# Patient Record
Sex: FEMALE | Employment: FULL TIME | URBAN - METROPOLITAN AREA
[De-identification: names, ages, dates, MRNs, and addresses within clinical notes are randomized per-mention and may not be internally consistent; named-entity substitution may affect disease eponyms.]

---

## 2017-04-01 ENCOUNTER — TELEPHONE (OUTPATIENT)
Dept: FAMILY MEDICINE | Facility: CLINIC | Age: 22
End: 2017-04-01

## 2017-04-03 ENCOUNTER — ALLIED HEALTH/NURSE VISIT (OUTPATIENT)
Dept: NURSING | Facility: CLINIC | Age: 22
End: 2017-04-03
Payer: COMMERCIAL

## 2017-04-03 DIAGNOSIS — Z11.1 SCREENING EXAMINATION FOR PULMONARY TUBERCULOSIS: Primary | ICD-10-CM

## 2017-04-03 PROCEDURE — 86580 TB INTRADERMAL TEST: CPT

## 2017-04-03 PROCEDURE — 99207 ZZC NO CHARGE NURSE ONLY: CPT

## 2017-04-03 NOTE — PROGRESS NOTES
Chief Complaint   Patient presents with     Imm/Inj     Mantoux         The patient is asked the following questions today and these are her answers:    -Have you had a mantoux administered in the past 30 days?    No  -Have you had a previous positive Mantoux.  No  -Have you received BCG in the past.  No  -Have you had a live vaccine  (MMR, Varicella, OPV, Yellow Fever) in the last 6 weeks.  No  -Have you had and active  viral or bacterial infection in the past 6 weeks.  No  -Have you received corticosteroids or immunosuppressive agents in the past 6 weeks.  No  -Have you been diagnosed with HIV?  No  -Do you have a maglinancy?  No   Nery Kramer CMA ([facility])

## 2017-04-03 NOTE — MR AVS SNAPSHOT
"              After Visit Summary   4/3/2017    Shari De La Cruz    MRN: [id number]           Patient Information     Date Of Birth          1995        Visit Information        Provider Department      4/3/2017 1:30 PM FZ ANCILLARY [facility]        Today's Diagnoses     Screening examination for pulmonary tuberculosis    -  1       Follow-ups after your visit        Your next 10 appointments already scheduled     Apr 21, 2017  1:40 PM CDT   PHYSICAL with Argelia Vicente, APRN CNP   [facility] ([facility]    [address] [id number] [phone]              Who to contact     If you have questions or need follow up information about today's clinic visit or your schedule please contact [facility] directly at [phone].  Normal or non-critical lab and imaging results will be communicated to you by Makstrhart, letter or phone within 4 business days after the clinic has received the results. If you do not hear from us within 7 days, please contact the clinic through MyChart or phone. If you have a critical or abnormal lab result, we will notify you by phone as soon as possible.  Submit refill requests through [employer] or call your pharmacy and they will forward the refill request to us. Please allow 3 business days for your refill to be completed.          Additional Information About Your Visit        MyChart Information     [employer] lets you send messages to your doctor, view your test results, renew your prescriptions, schedule appointments and more. To sign up, go to [email or web address] . Click on \"Log in\" on the left side of the screen, which will take you to the Welcome page. Then click on \"Sign up Now\" on the right side of the page.     You will be asked to enter the access code listed below, as well as some personal information. Please follow the directions to create your username and password.     Your access code is: " [email or web address]  Expires: 2017  1:54 PM     Your access code will  in 90 days. If you need help or a new code, please call your [place] clinic or [phone].        Care EveryWhere ID     This is your Care EveryWhere ID. This could be used by other organizations to access your [place] medical records  [id number]         Blood Pressure from Last 3 Encounters:   10/02/15 104/62   11/03/10 113/71   08/30/10 106/71    Weight from Last 3 Encounters:   10/02/15 114 lb (51.7 kg)   11/03/10 102 lb (46.3 kg) (14 %)*   08/30/10 107 lb (48.5 kg) (25 %)*     * Growth percentiles are based on [facility] 2-20 Years data.              We Performed the Following     TB INTRADERMAL TEST        Primary Care Provider Office Phone # Fax #    Yao Mohamud [phone]       [facility] [address] [address]        Thank you!     Thank you for choosing [facility]  for your care. Our goal is always to provide you with excellent care. Hearing back from our patients is one way we can continue to improve our services. Please take a few minutes to complete the written survey that you may receive in the mail after your visit with us. Thank you!             Your Updated Medication List - Protect others around you: Learn how to safely use, store and throw away your medicines at www.disposemymeds.org.          This list is accurate as of: 4/3/17  1:54 PM.  Always use your most recent med list.                   Brand Name Dispense Instructions for use    doxycycline Monohydrate 100 MG Tabs          ketoconazole 2 % cream    NIZORAL     Apply topically 2 times daily apply to affected areas

## 2017-04-20 NOTE — PATIENT INSTRUCTIONS
[facility]    If you have any questions regarding to your visit please contact your care team:       Team Red:   Clinic Hours Telephone Number   Dr. Rosa Adler  (pediatrics)  Argelia Vicente NP 7am-7pm  Monday - Thursday   7am-5pm  Fridays  [phone] (fax)    Mega MOYA  [phone]   Urgent Care - [place] and [place] Monday-Friday  [place] - 11am-8pm  Saturday-Sunday  Both sites - 9am-5pm  [phone] - [facility]  [phone] - [place]       What options do I have for visits at the clinic other than the traditional office visit?  To expand how we care for you, many of our providers are utilizing electronic visits (e-visits) and telephone visits, when medically appropriate, for interactions with their patients rather than a visit in the clinic.   We also offer nurse visits for many medical concerns. Just like any other service, we will bill your insurance company for this type of visit based on time spent on the phone with your provider. Not all insurance companies cover these visits. Please check with your medical insurance if this type of visit is covered. You will be responsible for any charges that are not paid by your insurance.      E-visits via [employer] [address]:  generally incur a $35.00 fee.  Telephone visits:  Time spent on the phone: *charged based on time that is spent on the phone in increments of 10 minutes. Estimated cost:   5-10 mins $30.00   11-20 mins. $59.00   21-30 mins. $85.00     As always, Thank you for trusting us with your health care needs!              Preventive Health Recommendations  Female Ages 18 to 25     Yearly exam:     See your health care provider every year in order to  o Review health changes.   o Discuss preventive care.    o Review your medicines if your doctor has prescribed any.      You should be tested each year for STDs (sexually transmitted diseases).       After age 20, talk to your  provider about how often you should have cholesterol testing.      Starting at age 21, get a Pap test every three years. If you have an abnormal result, your doctor may have you test more often.      If you are at risk for diabetes, you should have a diabetes test (fasting glucose).     Shots:     Get a flu shot each year.     Get a tetanus shot every 10 years.     Consider getting the shot (vaccine) that prevents cervical cancer (Gardasil).    Nutrition:     Eat at least 5 servings of fruits and vegetables each day.    Eat whole-grain bread, whole-wheat pasta and brown rice instead of white grains and rice.    Talk to your provider about Calcium and Vitamin D.     Lifestyle    Exercise at least 150 minutes a week each week (30 minutes a day, 5 days a week). This will help you control your weight and prevent disease.    Limit alcohol to one drink per day.    No smoking.     Wear sunscreen to prevent skin cancer.    See your dentist every six months for an exam and cleaning.

## 2017-04-20 NOTE — PROGRESS NOTES
SUBJECTIVE:     CC: Shari De La Cruz is an 21 year old woman who presents for preventive health visit.     Healthy Habits:    Do you get at least three servings of calcium containing foods daily (dairy, green leafy vegetables, etc.)? yes    Amount of exercise or daily activities, outside of work: some time    Problems taking medications regularly No    Medication side effects: No    Have you had an eye exam in the past two years? yes    Do you see a dentist twice per year? yes    Do you have sleep apnea, excessive snoring or daytime drowsiness?no        Blood work done    Today's PHQ-2 Score:   PHQ-2 ( 1999 Pfizer) 10/2/2015   Q1: Little interest or pleasure in doing things 0   Q2: Feeling down, depressed or hopeless 0   PHQ-2 Score 0       Abuse: Current or Past(Physical, Sexual or Emotional)- No  Do you feel safe in your environment - Yes    Social History   Substance Use Topics     Smoking status: Never Smoker     Smokeless tobacco: Not on file     Alcohol use No     The patient does not drink >3 drinks per day nor >7 drinks per week.    No results for input(s): CHOL, HDL, LDL, TRIG, CHOLHDLRATIO, NHDL in the last 65107 hours.    Reviewed orders with patient.  Reviewed health maintenance and updated orders accordingly - Yes    Mammo Decision Support:  Mammogram not appropriate for this patient based on age.    Pertinent mammograms are reviewed under the imaging tab.  History of abnormal Pap smear: NO - age 21-29 PAP every 3 years recommended: patient declines pap today, will consider next year    Reviewed and updated as needed this visit by clinical staff         Reviewed and updated as needed this visit by Provider            ROS:  C: NEGATIVE for fever, chills, change in weight  I: NEGATIVE for worrisome rashes, moles or lesions  E: NEGATIVE for vision changes or irritation  ENT: NEGATIVE for ear, mouth and throat problems  R: NEGATIVE for significant cough or SOB  B: NEGATIVE for masses, tenderness or  "discharge  CV: NEGATIVE for chest pain, palpitations or peripheral edema  GI: NEGATIVE for nausea, abdominal pain, heartburn, or change in bowel habits  : NEGATIVE for unusual urinary or vaginal symptoms. Periods are regular.  MUSCULOSKELETAL:Right midfoot pain when running or exercising, no pain at rest. Flat arches. Was recommended to try over the counter arch supports but has not been successful. Wonders about custom orthotics.  N: NEGATIVE for weakness, dizziness or paresthesias  P: NEGATIVE for changes in mood or affect    Problem list, Medication list, Allergies, and Medical/Social/Surgical histories reviewed in [facility] and updated as appropriate.  OBJECTIVE:     /67  Pulse 91  Temp 98.7  F (37.1  C)  Resp 13  Ht 5' 2.5\" (1.588 m)  Wt 117 lb (53.1 kg)  LMP 04/07/2017  SpO2 100%  BMI 21.06 kg/m2  EXAM:  GENERAL: healthy, alert and no distress  EYES: Eyes grossly normal to inspection, PERRL and conjunctivae and sclerae normal  HENT: ear canals and TM's normal, nose and mouth without ulcers or lesions  NECK: no adenopathy, no asymmetry, masses, or scars and thyroid normal to palpation  RESP: lungs clear to auscultation - no rales, rhonchi or wheezes  BREAST: normal without masses, tenderness or nipple discharge and no palpable axillary masses or adenopathy  CV: regular rate and rhythm, normal S1 S2, no S3 or S4, no murmur, click or rub, no peripheral edema and peripheral pulses strong  ABDOMEN: soft, nontender, no hepatosplenomegaly, no masses and bowel sounds normal  MS: no gross musculoskeletal defects noted, no edema. Right foot nontender to palpation of navulicular (area of described pain) with FROM of foot and ankle  SKIN: no suspicious lesions or rashes  NEURO: Normal strength and tone, mentation intact and speech normal  PSYCH: mentation appears normal, affect normal/bright    ASSESSMENT/PLAN:     1. Routine general medical examination at a health care facility      2. Screening examination " "for venereal disease  Patient declines pap today- will do next year  - NEISSERIA GONORRHOEA PCR  - CHLAMYDIA TRACHOMATIS PCR    3. CARDIOVASCULAR SCREENING; LDL GOAL LESS THAN 160    - Lipid panel reflex to direct LDL  - minocycline (MINOCIN/DYNACIN) 100 MG capsule; Take 1 capsule (100 mg) by mouth 2 times daily  Dispense: 180 capsule; Refill: 3    4. Right foot pain    - PODIATRY/FOOT & ANKLE SURGERY REFERRAL    COUNSELING:   Reviewed preventive health counseling, as reflected in patient instructions       Regular exercise       Healthy diet/nutrition       Contraception       Osteoporosis Prevention/Bone Health         reports that she has never smoked. She does not have any smokeless tobacco history on file.    Estimated body mass index is 21.02 kg/(m^2) as calculated from the following:    Height as of 10/2/15: 5' 1.75\" (1.568 m).    Weight as of 10/2/15: 114 lb (51.7 kg).       Counseling Resources:  ATP IV Guidelines  Pooled Cohorts Equation Calculator  Breast Cancer Risk Calculator  FRAX Risk Assessment  ICSI Preventive Guidelines  Dietary Guidelines for Americans, 2010  USDA's MyPlate  ASA Prophylaxis  Lung CA Screening    PHILLY Nichols [facility]ANASTASIA  "

## 2017-04-28 ENCOUNTER — OFFICE VISIT (OUTPATIENT)
Dept: FAMILY MEDICINE | Facility: CLINIC | Age: 22
End: 2017-04-28
Payer: COMMERCIAL

## 2017-04-28 VITALS
SYSTOLIC BLOOD PRESSURE: 105 MMHG | TEMPERATURE: 98.7 F | DIASTOLIC BLOOD PRESSURE: 67 MMHG | HEART RATE: 91 BPM | OXYGEN SATURATION: 100 % | BODY MASS INDEX: 20.73 KG/M2 | WEIGHT: 117 LBS | HEIGHT: 63 IN | RESPIRATION RATE: 13 BRPM

## 2017-04-28 DIAGNOSIS — Z00.00 ROUTINE GENERAL MEDICAL EXAMINATION AT A HEALTH CARE FACILITY: Primary | ICD-10-CM

## 2017-04-28 DIAGNOSIS — Z13.6 CARDIOVASCULAR SCREENING; LDL GOAL LESS THAN 160: ICD-10-CM

## 2017-04-28 DIAGNOSIS — Z11.3 SCREENING EXAMINATION FOR VENEREAL DISEASE: ICD-10-CM

## 2017-04-28 DIAGNOSIS — M79.671 RIGHT FOOT PAIN: ICD-10-CM

## 2017-04-28 LAB
CHOLEST SERPL-MCNC: 151 MG/DL
HDLC SERPL-MCNC: 76 MG/DL
LDLC SERPL CALC-MCNC: 58 MG/DL
NONHDLC SERPL-MCNC: 75 MG/DL
TRIGL SERPL-MCNC: 85 MG/DL

## 2017-04-28 PROCEDURE — 99395 PREV VISIT EST AGE 18-39: CPT | Performed by: NURSE PRACTITIONER

## 2017-04-28 PROCEDURE — 36415 COLL VENOUS BLD VENIPUNCTURE: CPT | Performed by: NURSE PRACTITIONER

## 2017-04-28 PROCEDURE — 80061 LIPID PANEL: CPT | Performed by: NURSE PRACTITIONER

## 2017-04-28 PROCEDURE — 87491 CHLMYD TRACH DNA AMP PROBE: CPT | Performed by: NURSE PRACTITIONER

## 2017-04-28 PROCEDURE — 87591 N.GONORRHOEAE DNA AMP PROB: CPT | Performed by: NURSE PRACTITIONER

## 2017-04-28 RX ORDER — MINOCYCLINE HYDROCHLORIDE 100 MG/1
100 CAPSULE ORAL 2 TIMES DAILY
Qty: 180 CAPSULE | Refills: 3 | COMMUNITY
Start: 2017-04-28 | End: 2018-03-15

## 2017-04-28 NOTE — NURSING NOTE
"Chief Complaint   Patient presents with     Physical       Initial /67  Pulse 91  Temp 98.7  F (37.1  C)  Resp 13  Ht 5' 2.5\" (1.588 m)  Wt 117 lb (53.1 kg)  LMP 04/07/2017  SpO2 100%  BMI 21.06 kg/m2 Estimated body mass index is 21.06 kg/(m^2) as calculated from the following:    Height as of this encounter: 5' 2.5\" (1.588 m).    Weight as of this encounter: 117 lb (53.1 kg).  Medication Reconciliation: complete     Cate Rodriguez. MA      "

## 2017-04-28 NOTE — MR AVS SNAPSHOT
After Visit Summary   4/28/2017    Shari De La Cruz    MRN: [id number]           Patient Information     Date Of Birth          1995        Visit Information        Provider Department      4/28/2017 7:40 AM Argelia Vicente APRN [facility]        Today's Diagnoses     Routine general medical examination at a health care facility    -  1    Screening examination for venereal disease        Screening for malignant neoplasm of cervix        CARDIOVASCULAR SCREENING; LDL GOAL LESS THAN 160        Right foot pain          Care Instructions        [facility]    If you have any questions regarding to your visit please contact your care team:       Team Red:   Clinic Hours Telephone Number   Dr. Rosa Adler  (pediatrics)  Argelia Vicente NP 7am-7pm  Monday - Thursday   7am-5pm  Fridays  [phone] (fax)    Mega MOYA  [phone]   Urgent Care - [place] and [place] Monday-Friday  [place] - 11am-8pm  Saturday-Sunday  Both sites - 9am-5pm  [phone] - [facility]  [phone] - [place]       What options do I have for visits at the clinic other than the traditional office visit?  To expand how we care for you, many of our providers are utilizing electronic visits (e-visits) and telephone visits, when medically appropriate, for interactions with their patients rather than a visit in the clinic.   We also offer nurse visits for many medical concerns. Just like any other service, we will bill your insurance company for this type of visit based on time spent on the phone with your provider. Not all insurance companies cover these visits. Please check with your medical insurance if this type of visit is covered. You will be responsible for any charges that are not paid by your insurance.      E-visits via Face[employer]:  generally incur a $35.00 fee.  Telephone visits:  Time spent on the phone: *charged based  on time that is spent on the phone in increments of 10 minutes. Estimated cost:   5-10 mins $30.00   11-20 mins. $59.00   21-30 mins. $85.00     As always, Thank you for trusting us with your health care needs!              Preventive Health Recommendations  Female Ages 18 to 25     Yearly exam:     See your health care provider every year in order to  o Review health changes.   o Discuss preventive care.    o Review your medicines if your doctor has prescribed any.      You should be tested each year for STDs (sexually transmitted diseases).       After age 20, talk to your provider about how often you should have cholesterol testing.      Starting at age 21, get a Pap test every three years. If you have an abnormal result, your doctor may have you test more often.      If you are at risk for diabetes, you should have a diabetes test (fasting glucose).     Shots:     Get a flu shot each year.     Get a tetanus shot every 10 years.     Consider getting the shot (vaccine) that prevents cervical cancer (Gardasil).    Nutrition:     Eat at least 5 servings of fruits and vegetables each day.    Eat whole-grain bread, whole-wheat pasta and brown rice instead of white grains and rice.    Talk to your provider about Calcium and Vitamin D.     Lifestyle    Exercise at least 150 minutes a week each week (30 minutes a day, 5 days a week). This will help you control your weight and prevent disease.    Limit alcohol to one drink per day.    No smoking.     Wear sunscreen to prevent skin cancer.    See your dentist every six months for an exam and cleaning.        Follow-ups after your visit        Additional Services     PODIATRY/FOOT & ANKLE SURGERY REFERRAL       Your provider has referred you to: [facility] [place] [phone]   [email or web address]    Please be aware that coverage of these services is subject to the terms and limitations of your health insurance plan.  Call member  "services at your health plan with any benefit or coverage questions.      Please bring the following to your appointment:  >>   Any x-rays, CTs or MRIs which have been performed.  Contact the facility where they were done to arrange for  prior to your scheduled appointment.    >>   List of current medications   >>   This referral request   >>   Any documents/labs given to you for this referral                  Who to contact     If you have questions or need follow up information about today's clinic visit or your schedule please contact [facility] directly at [phone].  Normal or non-critical lab and imaging results will be communicated to you by HumanCloudhart, letter or phone within 4 business days after the clinic has received the results. If you do not hear from us within 7 days, please contact the clinic through "[employer]t or phone. If you have a critical or abnormal lab result, we will notify you by phone as soon as possible.  Submit refill requests through [employer] or call your pharmacy and they will forward the refill request to us. Please allow 3 business days for your refill to be completed.          Additional Information About Your Visit        [employer] Information     [employer] lets you send messages to your doctor, view your test results, renew your prescriptions, schedule appointments and more. To sign up, go to [email or web address] . Click on \"Log in\" on the left side of the screen, which will take you to the Welcome page. Then click on \"Sign up Now\" on the right side of the page.     You will be asked to enter the access code listed below, as well as some personal information. Please follow the directions to create your username and password.     Your access code is: [email or web address]  Expires: 2017  1:54 PM     Your access code will  in 90 days. If you need help or a new code, please call your [facility] or [phone].        Care EveryWhere ID     This is your Care " "EveryWhere ID. This could be used by other organizations to access your [place] medical records  [id number]        Your Vitals Were     Pulse Temperature Respirations Height Last Period Pulse Oximetry    91 98.7  F (37.1  C) 13 5' 2.5\" (1.588 m) 04/07/2017 100%    BMI (Body Mass Index)                   21.06 kg/m2            Blood Pressure from Last 3 Encounters:   04/28/17 105/67   10/02/15 104/62   11/03/10 113/71    Weight from Last 3 Encounters:   04/28/17 117 lb (53.1 kg)   10/02/15 114 lb (51.7 kg)   11/03/10 102 lb (46.3 kg) (14 %)*     * Growth percentiles are based on [facility] 2-20 Years data.              We Performed the Following     CHLAMYDIA TRACHOMATIS PCR     Lipid panel reflex to direct LDL     NEISSERIA GONORRHOEA PCR     PODIATRY/FOOT & ANKLE SURGERY REFERRAL          Today's Medication Changes          These changes are accurate as of: 4/28/17  8:12 AM.  If you have any questions, ask your nurse or doctor.               Stop taking these medicines if you haven't already. Please contact your care team if you have questions.     doxycycline Monohydrate 100 MG Tabs   Stopped by:  Argelia Vicente APRN CNP                    Primary Care Provider Office Phone # Fax #    PHILLY Nichols [phone]       [address] [address]        Thank you!     Thank you for choosing [facility]  for your care. Our goal is always to provide you with excellent care. Hearing back from our patients is one way we can continue to improve our services. Please take a few minutes to complete the written survey that you may receive in the mail after your visit with us. Thank you!             Your Updated Medication List - Protect others around you: Learn how to safely use, store and throw away your medicines at www.disposemymeds.org.          This list is accurate as of: 4/28/17  8:12 AM.  Always use your most recent med list.       "             Brand Name Dispense Instructions for use    minocycline 100 MG capsule    MINOCIN/DYNACIN    180 capsule    Take 1 capsule (100 mg) by mouth 2 times daily

## 2017-04-30 LAB
C TRACH DNA SPEC QL NAA+PROBE: NORMAL
N GONORRHOEA DNA SPEC QL NAA+PROBE: NORMAL
SPECIMEN SOURCE: NORMAL
SPECIMEN SOURCE: NORMAL

## 2017-05-05 ENCOUNTER — TELEPHONE (OUTPATIENT)
Dept: PODIATRY | Facility: CLINIC | Age: 22
End: 2017-05-05

## 2017-05-05 ENCOUNTER — OFFICE VISIT (OUTPATIENT)
Dept: PODIATRY | Facility: CLINIC | Age: 22
End: 2017-05-05
Payer: COMMERCIAL

## 2017-05-05 ENCOUNTER — RADIANT APPOINTMENT (OUTPATIENT)
Dept: GENERAL RADIOLOGY | Facility: CLINIC | Age: 22
End: 2017-05-05
Attending: PODIATRIST
Payer: COMMERCIAL

## 2017-05-05 VITALS — OXYGEN SATURATION: 100 % | WEIGHT: 117 LBS | BODY MASS INDEX: 21.06 KG/M2 | HEART RATE: 86 BPM

## 2017-05-05 DIAGNOSIS — M21.6X2 PRONATION DEFORMITY OF BOTH FEET: ICD-10-CM

## 2017-05-05 DIAGNOSIS — M21.6X1 PRONATION DEFORMITY OF BOTH FEET: Primary | ICD-10-CM

## 2017-05-05 DIAGNOSIS — M79.671 RIGHT FOOT PAIN: ICD-10-CM

## 2017-05-05 DIAGNOSIS — M21.6X1 PRONATION DEFORMITY OF BOTH FEET: ICD-10-CM

## 2017-05-05 DIAGNOSIS — M21.6X2 PRONATION DEFORMITY OF BOTH FEET: Primary | ICD-10-CM

## 2017-05-05 PROCEDURE — 99203 OFFICE O/P NEW LOW 30 MIN: CPT | Performed by: PODIATRIST

## 2017-05-05 PROCEDURE — 73630 X-RAY EXAM OF FOOT: CPT | Mod: RT

## 2017-05-05 NOTE — TELEPHONE ENCOUNTER
Reason for Call:  Other call back    Detailed comments: Patient calling and asking for the Diagnosis code for the procedure that Dr. Garcia suggested. Please contact patient.    Phone Number Patient can be reached at: Home number on file [phone] (home)    Best Time: any time    Can we leave a detailed message on this number? YES    Call taken on 5/5/2017 at 2:51 PM by Magaly Gonzalez

## 2017-05-05 NOTE — PATIENT INSTRUCTIONS
We wish you continued good healing. If you have any questions or concerns, please do not hesitate to contact us at [phone].      Please remember to call and schedule a follow up appointment if one was recommended at your earliest convenience.   PODIATRY CLINIC HOURS  TELEPHONE NUMBER    Dr. David Garcia D.P.[facility]    Clinics:  [facility]        Saira Gamez MA  Medical Assistant  Tuesday 1PM-6PM  [facility]  Wednesday 7AM-2PM  [place]  Thursday 10AM-6PM  [place] Friday 7AM-345PM  [place]  Specialty schedulers:   [phone] to make an appointment with any Specialty Provider.        Urgent Care locations:    [facility] Monday-Friday 5 pm - 9 pm. Saturday-Sunday 9 am -5pm    Monday-Friday 11 am - 9 pm Saturday 9 am - 5 pm     Monday-Sunday 12 noon-8PM [phone]     If you need a medication refill, please contact us you may need lab work and/or a follow up visit prior to your refill (i.e. Antifungal medications).    Interviewhart (secure e-mail communication and access to your chart) to send a message or to make an appointment.    If MRI needed please call Beto Flood at [phone]        Weight management plan: Patient was referred to their PCP to discuss a diet and exercise plan.

## 2017-05-05 NOTE — TELEPHONE ENCOUNTER
Patient is returning a call back from the clinic. please call her back at [phone] (home)   Patient states she needs a diagnosis code and a procedure code for her insurance.      Francesca Knox  Patient Representative

## 2017-05-05 NOTE — NURSING NOTE
"Chief Complaint   Patient presents with     Foot Pain     r foot no injury       Initial Pulse 86  Wt 53.1 kg (117 lb)  LMP 04/07/2017  SpO2 100%  BMI 21.06 kg/m2 Estimated body mass index is 21.06 kg/(m^2) as calculated from the following:    Height as of 4/28/17: 1.588 m (5' 2.5\").    Weight as of this encounter: 53.1 kg (117 lb).  Medication Reconciliation: complete  "

## 2017-05-05 NOTE — PROGRESS NOTES
S:  Patient seen in consult from Argelia Vicente and complains of right foot pain.  Points to plantar medial arch.  Has had this for years.  Describes it as a burning pain.  Aggrevated by activity and relieved by rest.  Worse in bad shoes.  On her feet at work.      ROS:  Denies bruising, swelling,weakness, or numbness.     No Known Allergies    Current Outpatient Prescriptions   Medication Sig Dispense Refill     minocycline (MINOCIN/DYNACIN) 100 MG capsule Take 1 capsule (100 mg) by mouth 2 times daily 180 capsule 3       Patient Active Problem List   Diagnosis     CARDIOVASCULAR SCREENING; LDL GOAL LESS THAN 160       Past Medical History:   Diagnosis Date     Hyperpigmentation of skin     sees [facility]       Past Surgical History:   Procedure Laterality Date     DENTAL SURGERY  1998       Family History   Problem Relation Age of Onset     Arthritis Mother      DIABETES Maternal Grandmother      Eye Disorder Maternal Grandmother        Social History   Substance Use Topics     Smoking status: Never Smoker     Smokeless tobacco: Not on file     Alcohol use No         O:  Pulse 86  Wt 53.1 kg (117 lb)  LMP 04/07/2017  SpO2 100%  BMI 21.06 kg/m2.  Good historian.  A&O X 3.  Pulses DP, PT 2/4 b/l.  CRT < 3 seconds X 10 digits.  No edema or varicosities noted.  Sensation to light touch intact b/l.  Reflexes 2/4 b/l.  Skin has normal texture and turgor b/l.  No forefoot deformities noted.  MS 5/5 all compartments.  Normal ROM all fore foot and rearfoot joints.  No equinus.  With weightbearing patient has bilateral pronation.  No pain with palpation or ROM.  No pain with stressing any muscle compartments.  Good calcaneal iversion with foot flexion.  no erythema edema or ecchymosis or masses noted.  X-rays normal    A:  Pronation causing pain    P:  X-rays taken today.  RX for custom orthotics.  Discussed importance of wearing these in a good shoe at all times to prevent future problems.  Discussed good  house shoes at all times until resolved.  Avoid activities that bother this.   RETURN TO CLINIC PRN.  Thank you for allowing me participate in the care of this patient.        David Garcia DPM, FACFAS

## 2017-05-05 NOTE — LETTER
5/5/2017       RE: Shari De La Cruz  [address]  [place] [address]           Dear Colleague,    Thank you for referring your patient, Shari De La Cruz, to the [facility]. Please see a copy of my visit note below.    S:  Patient seen in consult from Argelia Vicente and complains of right foot pain.  Points to plantar medial arch.  Has had this for years.  Describes it as a burning pain.  Aggrevated by activity and relieved by rest.  Worse in bad shoes.  On her feet at work.      ROS:  Denies bruising, swelling,weakness, or numbness.     No Known Allergies    Current Outpatient Prescriptions   Medication Sig Dispense Refill     minocycline (MINOCIN/DYNACIN) 100 MG capsule Take 1 capsule (100 mg) by mouth 2 times daily 180 capsule 3       Patient Active Problem List   Diagnosis     CARDIOVASCULAR SCREENING; LDL GOAL LESS THAN 160       Past Medical History:   Diagnosis Date     Hyperpigmentation of skin     sees [facility]       Past Surgical History:   Procedure Laterality Date     DENTAL SURGERY  1998       Family History   Problem Relation Age of Onset     Arthritis Mother      DIABETES Maternal Grandmother      Eye Disorder Maternal Grandmother        Social History   Substance Use Topics     Smoking status: Never Smoker     Smokeless tobacco: Not on file     Alcohol use No         O:  Pulse 86  Wt 53.1 kg (117 lb)  LMP 04/07/2017  SpO2 100%  BMI 21.06 kg/m2.  Good historian.  A&O X 3.  Pulses DP, PT 2/4 b/l.  CRT < 3 seconds X 10 digits.  No edema or varicosities noted.  Sensation to light touch intact b/l.  Reflexes 2/4 b/l.  Skin has normal texture and turgor b/l.  No forefoot deformities noted.  MS 5/5 all compartments.  Normal ROM all fore foot and rearfoot joints.  No equinus.  With weightbearing patient has bilateral pronation.  No pain with palpation or ROM.  No pain with stressing any muscle compartments.  Good calcaneal iversion with foot flexion.  no erythema edema  or ecchymosis or masses noted.  X-rays normal    A:  Pronation causing pain    P:  X-rays taken today.  RX for custom orthotics.  Discussed importance of wearing these in a good shoe at all times to prevent future problems.  Discussed good house shoes at all times until resolved.  Avoid activities that bother this.   RETURN TO CLINIC PRN.  Thank you for allowing me participate in the care of this patient.        David Garcia DPM, FACFAS           Again, thank you for allowing me to participate in the care of your patient.        Sincerely,              David Garcia DPM

## 2017-05-05 NOTE — MR AVS SNAPSHOT
After Visit Summary   5/5/2017    Shari De La Cruz    MRN: [id number]           Patient Information     Date Of Birth          1995        Visit Information        Provider Department      5/5/2017 1:45 PM David Garcia DPM [facility]        Care Instructions    We wish you continued good healing. If you have any questions or concerns, please do not hesitate to contact us at [phone].      Please remember to call and schedule a follow up appointment if one was recommended at your earliest convenience.   PODIATRY CLINIC HOURS  TELEPHONE NUMBER    Dr. David MIXONPLORENA FAC FAS    Clinics:  [facility]        Saira Gamez MA  Medical Assistant  Tuesday 1PM-6PM  [facility]  Wednesday 7AM-2PM  [place]  Thursday 10AM-6PM  [place] Friday 7AM-345PM  [place]  Specialty schedulers:   [phone] to make an appointment with any Specialty Provider.        Urgent Care locations:    [facility] Monday-Friday 5 pm - 9 pm. Saturday-Sunday 9 am -5pm    Monday-Friday 11 am - 9 pm Saturday 9 am - 5 pm     Monday-Sunday 12 noon-8PM [phone]     If you need a medication refill, please contact us you may need lab work and/or a follow up visit prior to your refill (i.e. Antifungal medications).    [employer] (secure e-mail communication and access to your chart) to send a message or to make an appointment.    If MRI needed please call Beto Flood at [phone]        Weight management plan: Patient was referred to their PCP to discuss a diet and exercise plan.          Follow-ups after your visit        Your next 10 appointments already scheduled     May 05, 2017  1:45 PM CDT   New Visit with David Garcia DPM   [facility]    [address] [place] [address]  [place] [id number] [phone]              Who to contact     If you have questions or need follow up information about today's clinic visit or your schedule please contact Critical access hospital directly at [phone].  Normal or non-critical lab and imaging results will be communicated to you by MyChart, letter or phone within 4 business days after the clinic has received the results. If you do not hear from us within 7 days, please contact the clinic through [employer] or phone. If you have a critical or abnormal lab result, we will notify you by phone as soon as possible.  Submit refill requests through [employer] or call your pharmacy and they will forward the refill request to us. Please allow 3 business days for your refill to be completed.          Additional Information About Your Visit        [employer] Information     [employer] gives you secure access to your electronic health record. If you see a primary care provider, you can also send messages to your care team and make appointments. If you have questions, please call your primary care clinic.  If you do not have a primary care provider, please call [phone] and they will assist you.        Care EveryWhere ID     This is your Care EveryWhere ID. This could be used by other organizations to access your [facility] medical records  [id number]        Your Vitals Were     Pulse Last Period Pulse Oximetry BMI (Body Mass Index)          86 04/07/2017 100% 21.06 kg/m2         Blood Pressure from Last 3 Encounters:   04/28/17 105/67   10/02/15 104/62   11/03/10 113/71    Weight from Last 3 Encounters:   05/05/17 53.1 kg (117 lb)   04/28/17 53.1 kg (117 lb)   10/02/15 51.7 kg (114 lb)              Today, you had the following     No orders found for display       Primary Care Provider Office Phone # Fax #    PHILLY Nichols [phone]       [address]  [facility] [address]        Thank you!      Thank you for choosing [facility]  for your care. Our goal is always to provide you with excellent care. Hearing back from our patients is one way we can continue to improve our services. Please take a few minutes to complete the written survey that you may receive in the mail after your visit with us. Thank you!             Your Updated Medication List - Protect others around you: Learn how to safely use, store and throw away your medicines at www.disposemymeds.org.          This list is accurate as of: 5/5/17  1:44 PM.  Always use your most recent med list.                   Brand Name Dispense Instructions for use    minocycline 100 MG capsule    MINOCIN/DYNACIN    180 capsule    Take 1 capsule (100 mg) by mouth 2 times daily

## 2017-10-08 ENCOUNTER — HEALTH MAINTENANCE LETTER (OUTPATIENT)
Age: 22
End: 2017-10-08

## 2018-03-08 NOTE — PROGRESS NOTES
"  SUBJECTIVE:   Shari De La Cruz is a 22 year old female who presents to clinic today for the following health issues:      Chief Complaint   Patient presents with     Contraception       Patient is not currently sexually active but plans to become sexually active with long-term boyfriend. Menses regular. She is most interested in birth control pills.    Problem list and histories reviewed & adjusted, as indicated.  Additional history: as documented    Patient Active Problem List   Diagnosis     CARDIOVASCULAR SCREENING; LDL GOAL LESS THAN 160     Past Surgical History:   Procedure Laterality Date     DENTAL SURGERY  1998       Social History   Substance Use Topics     Smoking status: Never Smoker     Smokeless tobacco: Never Used     Alcohol use No     Family History   Problem Relation Age of Onset     Arthritis Mother      DIABETES Maternal Grandmother      Eye Disorder Maternal Grandmother            Reviewed and updated as needed this visit by clinical staff       Reviewed and updated as needed this visit by Provider         ROS:  Constitutional, gu systems are negative, except as otherwise noted.    OBJECTIVE:     /70  Pulse 96  Temp 97.2  F (36.2  C)  Resp 14  Ht 5' 2.5\" (1.588 m)  Wt 118 lb (53.5 kg)  LMP 02/12/2018  SpO2 99%  BMI 21.24 kg/m2  Body mass index is 21.24 kg/(m^2).  GENERAL: healthy, alert and no distress  PSYCH: mentation appears normal, affect normal/bright    Diagnostic Test Results:  No results found for this or any previous visit (from the past 24 hour(s)).    ASSESSMENT/PLAN:     1. Encounter for initial prescription of contraceptive pills  After reviewing all available methods, patient would like to start the pill. Risks/benefits reviewed.  Advised to start the Sunday following next period and use back-up method of condoms for 1 week. Condom use advised consistently for STD prevention unless in mutually monogamous relationship. Recommend boyfriend be screened prior to having " intercourse with him.    - desogestrel-ethinyl estradiol (APRI) 0.15-30 MG-MCG per tablet; Take 1 tablet by mouth daily  Dispense: 84 tablet; Refill: 3    Follow up May or June for physical w/pap    PHILLY Nichols [facility]

## 2018-03-15 ENCOUNTER — OFFICE VISIT (OUTPATIENT)
Dept: FAMILY MEDICINE | Facility: CLINIC | Age: 23
End: 2018-03-15
Payer: COMMERCIAL

## 2018-03-15 VITALS
SYSTOLIC BLOOD PRESSURE: 120 MMHG | DIASTOLIC BLOOD PRESSURE: 70 MMHG | WEIGHT: 118 LBS | RESPIRATION RATE: 14 BRPM | BODY MASS INDEX: 20.91 KG/M2 | HEIGHT: 63 IN | OXYGEN SATURATION: 99 % | HEART RATE: 96 BPM | TEMPERATURE: 97.2 F

## 2018-03-15 DIAGNOSIS — Z30.011 ENCOUNTER FOR INITIAL PRESCRIPTION OF CONTRACEPTIVE PILLS: Primary | ICD-10-CM

## 2018-03-15 PROCEDURE — 99213 OFFICE O/P EST LOW 20 MIN: CPT | Performed by: NURSE PRACTITIONER

## 2018-03-15 RX ORDER — DESOGESTREL AND ETHINYL ESTRADIOL 0.15-0.03
1 KIT ORAL DAILY
Qty: 84 TABLET | Refills: 3 | Status: SHIPPED | OUTPATIENT
Start: 2018-03-15 | End: 2019-02-15

## 2018-03-15 RX ORDER — DOXYCYCLINE 100 MG/1
2 CAPSULE ORAL PRN
Refills: 4 | COMMUNITY
Start: 2017-06-20

## 2018-03-15 NOTE — MR AVS SNAPSHOT
After Visit Summary   3/15/2018    Shari De La Cruz    MRN: [id number]           Patient Information     Date Of Birth          1995        Visit Information        Provider Department      3/15/2018 8:00 AM Argelia Vicente APRN [facility]        Today's Diagnoses     Encounter for initial prescription of contraceptive pills    -  1      Care Instructions                    Birth Control Methods  What is contraception?   Birth control and contraception are terms used to refer to ways to prevent pregnancy. There are many ways to prevent pregnancy when you have sexual intercourse. They include the use of hormone medicines, contraceptive devices (barriers and IUDs), periods of avoiding sex, spermicides, withdrawal, and devices and surgery for sterilization. Some birth control methods work better than others.  You may want to choose a kind of birth control that will also help keep you from getting sexually transmitted disease (STD). Sometimes you may need to use more than one method to try to prevent pregnancy AND infection. You can use latex or polyurethane male condoms or the female condom to try to avoid getting STDs. They are the only birth control methods that will lessen your risk of being infected with HIV, the virus that causes AIDS. Hormones, natural family planning, and withdrawal do not give any protection against infection.  What are the different methods of contraception?   Hormone Medicines  Birth control pills, shots, vaginal rings, skin patches, and implants contain manmade forms of the hormones estrogen and/or progesterone. The hormones stop a woman's ovaries from releasing an egg each month. They also make it harder for sperm to get into the uterus or for a fertilized egg to stay in the uterus.  Birth control pills are taken according to a daily schedule prescribed by your healthcare provider.   One shot of Depo-Provera, which contains progesterone, can prevent  pregnancy for 3 months.   Vaginal rings are flexible rings put into the vagina. The rings release hormones into the body. A ring stays in the vagina for 3 weeks. Then it is removed. After 1 week a new ring is put into the vagina and the cycle is repeated.   Patches containing hormones may be put on the skin. A new patch is worn every week for 3 weeks. During the 4th week, no patch is worn. Then the cycle is repeated.   The implant (Implanon) is a small, thin capsule containing progesterone. It is put under the skin of a woman's arm. The implant prevents pregnancy for up to 3 years.  You will need to see your healthcare provider to get any of these hormonal forms of birth control.  Contraceptive Devices  Most contraceptive devices form physical or chemical barriers that stop sperm from getting into the uterus.  The male condom is a tube of thin material. (Latex rubber or polyurethane is best.) It is rolled over the erect penis before the penis touches any part of a woman's genital area. The male condom is the best protection against STDs, including HIV and hepatitis B.   The female condom is a 7-inch-long pouch. The pouch is made of polyurethane. It has 2 flexible rings. It is inserted into the vagina before sex. The female condom covers the cervix, vagina, and area around the vagina. It may protect against some STDs, such as HIV and hepatitis B.   Spermicides are chemicals that kill sperm. They are available as foam, jelly, foaming tablets, vaginal suppositories, or cream. They are inserted into the vagina no more than 30 minutes before sex. Spermicides should NOT be used alone. They work much better when they are used with another form of birth control, such as a condom or diaphragm. Spermicides do not protect against STDs.   The sponge is a round, soft piece of polyurethane foam. It is soaked with a spermicide. No more than 24 hours before intercourse, the sponge is dampened with water and inserted into the vagina  against the cervix.   The diaphragm is a soft rubber dome stretched over a flexible ring. No more than 6 hours before sex, you fill the diaphragm with a spermicidal jelly or cream and put it into the vagina.   The intrauterine device (IUD) is a small plastic device containing copper or hormones. The IUD prevents the egg from being fertilized or implanting and growing in the uterus. An IUD is put into the uterus by your healthcare provider. Depending on the type, it may be kept in the uterus 5 to 10 years before it must be replaced with a new one.  You can buy condoms, spermicides, and sponges at drug and grocery stores without a prescription. A diaphragm needs to be fitted by a healthcare provider. If you choose to use an IUD, you will need to see your provider for insertion of the IUD.  Sterilization  Sterilization is a procedure done to close the tubes that carry the sperm or eggs. It may be done with surgery or special devices put into the tubes. A woman or man who is sterilized will no longer be able to have children. These procedures are usually permanent methods of birth control.   Removal of the uterus (hysterectomy) causes a woman to be sterile and unable to get pregnant. Hysterectomy is usually only done if there are problems with the female organs, such as fibroids or abnormal menstrual bleeding.  Natural Family Planning (Periodic Abstinence) and the Withdrawal Method  The natural family planning methods of birth control do not use any devices, drugs, or surgery. To prevent pregnancy you avoid having sex on certain days of each menstrual cycle. Other methods of birth control are more reliable.  The withdrawal method involves removing the penis from the vagina before ejaculation, when semen starts coming out. Often sperm get into the vagina before or during withdrawal. This method is unreliable. It often does not prevent pregnancy.  Emergency Contraception  A pill for emergency birth control may be taken to  prevent pregnancy soon after you have had sex without birth control. It may also be used when another method of birth control has failed (for example, if a condom breaks). The pill contains a hormone that will prevent pregnancy if it is taken very soon after intercourse (within 3 to 5 days, depending on the medicine). In many areas, the pills are available at drug stores without a prescription for women over 18 years old.   A copper intrauterine device (IUD) is another type of emergency birth control. The IUD may be put into the uterus by your healthcare provider within 5 days after unprotected sex. It may prevent pregnancy by stopping fertilization or keeping a fertilized egg from attaching to the womb.  How well do the various methods prevent pregnancy?   The following chart shows the typical failure rates of the different kinds of birth control methods. The failure rate is the number of pregnancies expected per 100 women during 1 year of using each method. The failure rate can depend on how correctly each method is followed. If a method is used perfectly, the failure rate is lower than the typical rate shown here. Use of more than 1 method (for example, birth control pills and condoms) can decrease the chances of failure.                               Percentage of Women Having an     Birth Control             Unintended Pregnancy within the        Method                       First Year of Use    -----------------------------------------------------------                                   Typical Use    Perfect Use    -----------------------------------------------------------   Spermicides                         29%          18%   Natural Family Planning     (Periodic Abstinence)                           9%     Symptothermal method                            2%   Withdrawal                          18%           4%   Diaphragm with Spermicide           16%           6%   Condom     Female                             27%           5%     Male                              17%           2%   Sponge     Women who have given birth        32%          26%     Women who have not given birth    16%           9%   Pill                                 9%           0.3%   IUD     with copper                        1%           0.6%     with hormones                      0.1%         0.1%   Shot (Depo-Provera)                  7%           0.3%   Implant                              1%           0.05%   Patch (Ortho Evra)                   8%           0.3%   Vaginal ring (NuvaRing)              8%           0.3%   Female Sterilization                 0.7%         0.5%   Male Sterilization                   0.2%         0.1%   No Method                           85%          85%   -----------------------------------------------------------      Note: These failure rates are modified from the [employer] [place] January 2008. Facts on Contraceptive Use. Available at http://www.[employer].org/pubs/fb_contr_use.html        [facility]    If you have any questions regarding to your visit please contact your care team:       Team Red:   Clinic Hours Telephone Number   Dr. Rosa Adler  (pediatrics)  Argelia Vicente NP 7am-7pm  Monday - Thursday   7am-5pm  Fridays  [phone] (fax)    Deb MOYA  [phone]   Urgent Care - [place] and [place] Monday-Friday  [place] - 11am-8pm  Saturday-Sunday  Both sites - 9am-5pm  [phone] - [facility]  [phone] [facility]       What options do I have for visits at the clinic other than the traditional office visit?  To expand how we care for you, many of our providers are utilizing electronic visits (e-visits) and telephone visits, when medically appropriate, for interactions with their patients rather than a visit in the clinic.   We also offer nurse visits for many medical concerns. Just like any other service,  we will bill your insurance company for this type of visit based on time spent on the phone with your provider. Not all insurance companies cover these visits. Please check with your medical insurance if this type of visit is covered. You will be responsible for any charges that are not paid by your insurance.      E-visits via All My Dat[employer]:  generally incur a $35.00 fee.  Telephone visits:  Time spent on the phone: *charged based on time that is spent on the phone in increments of 10 minutes. Estimated cost:   5-10 mins $30.00   11-20 mins. $59.00   21-30 mins. $85.00     As always, Thank you for trusting us with your health care needs!    Akbar Mujicatad                    Follow-ups after your visit        Who to contact     If you have questions or need follow up information about today's clinic visit or your schedule please contact [facility] directly at [phone].  Normal or non-critical lab and imaging results will be communicated to you by Esphionhart, letter or phone within 4 business days after the clinic has received the results. If you do not hear from us within 7 days, please contact the clinic through All My Dat[employer] or phone. If you have a critical or abnormal lab result, we will notify you by phone as soon as possible.  Submit refill requests through [employer] or call your pharmacy and they will forward the refill request to us. Please allow 3 business days for your refill to be completed.          Additional Information About Your Visit        [employer] Information     [employer] gives you secure access to your electronic health record. If you see a primary care provider, you can also send messages to your care team and make appointments. If you have questions, please call your primary care clinic.  If you do not have a primary care provider, please call [phone] and they will assist you.        Care EveryWhere ID     This is your Care EveryWhere ID. This could be used by other organizations to  "access your [place] medical records  [id number]        Your Vitals Were     Pulse Temperature Respirations Height Last Period Pulse Oximetry    96 97.2  F (36.2  C) 14 5' 2.5\" (1.588 m) 02/12/2018 99%    BMI (Body Mass Index)                   21.24 kg/m2            Blood Pressure from Last 3 Encounters:   03/15/18 120/70   04/28/17 105/67   10/02/15 104/62    Weight from Last 3 Encounters:   03/15/18 118 lb (53.5 kg)   05/05/17 117 lb (53.1 kg)   04/28/17 117 lb (53.1 kg)              Today, you had the following     No orders found for display         Today's Medication Changes          These changes are accurate as of 3/15/18  8:30 AM.  If you have any questions, ask your nurse or doctor.               Start taking these medicines.        Dose/Directions    desogestrel-ethinyl estradiol 0.15-30 MG-MCG per tablet   Commonly known as:  APRI   Used for:  Encounter for initial prescription of contraceptive pills   Started by:  Argelia Vicente APRN CNP        Dose:  1 tablet   Take 1 tablet by mouth daily   Quantity:  84 tablet   Refills:  3            Where to get your medicines      These medications were sent to [facility]/pharmacy [id number] - [place] [id number]     Phone:  [phone]     desogestrel-ethinyl estradiol 0.15-30 MG-MCG per tablet                Primary Care Provider Office Phone # Fax #    PHILLY Nichols [facility] [phone]       [address] [facility] [address]        Equal Access to Services     DEO ARGUETA AH: Hadkelsi Ugalde, wastefanyda luqadaha, qaybta kaalmasamina patricio. So [facility] [phone].    ATENCIÓN: Si habla español, tiene a rojas disposición servicios gratuitos de asistencia lingüística. [place] al [phone].    We comply with applicable federal civil rights laws and [place] laws. We do not discriminate on the basis of race, color, national " origin, age, disability, sex, sexual orientation, or gender identity.            Thank you!     Thank you for choosing [facility]  for your care. Our goal is always to provide you with excellent care. Hearing back from our patients is one way we can continue to improve our services. Please take a few minutes to complete the written survey that you may receive in the mail after your visit with us. Thank you!             Your Updated Medication List - Protect others around you: Learn how to safely use, store and throw away your medicines at www.disposemymeds.org.          This list is accurate as of 3/15/18  8:30 AM.  Always use your most recent med list.                   Brand Name Dispense Instructions for use Diagnosis    desogestrel-ethinyl estradiol 0.15-30 MG-MCG per tablet    APRI    84 tablet    Take 1 tablet by mouth daily    Encounter for initial prescription of contraceptive pills       doxycycline monohydrate 100 MG capsule      Take 2 capsules by mouth as needed

## 2018-03-15 NOTE — PATIENT INSTRUCTIONS
Birth Control Methods  What is contraception?   Birth control and contraception are terms used to refer to ways to prevent pregnancy. There are many ways to prevent pregnancy when you have sexual intercourse. They include the use of hormone medicines, contraceptive devices (barriers and IUDs), periods of avoiding sex, spermicides, withdrawal, and devices and surgery for sterilization. Some birth control methods work better than others.  You may want to choose a kind of birth control that will also help keep you from getting sexually transmitted disease (STD). Sometimes you may need to use more than one method to try to prevent pregnancy AND infection. You can use latex or polyurethane male condoms or the female condom to try to avoid getting STDs. They are the only birth control methods that will lessen your risk of being infected with HIV, the virus that causes AIDS. Hormones, natural family planning, and withdrawal do not give any protection against infection.  What are the different methods of contraception?   Hormone Medicines  Birth control pills, shots, vaginal rings, skin patches, and implants contain manmade forms of the hormones estrogen and/or progesterone. The hormones stop a woman's ovaries from releasing an egg each month. They also make it harder for sperm to get into the uterus or for a fertilized egg to stay in the uterus.  Birth control pills are taken according to a daily schedule prescribed by your healthcare provider.   One shot of Depo-Provera, which contains progesterone, can prevent pregnancy for 3 months.   Vaginal rings are flexible rings put into the vagina. The rings release hormones into the body. A ring stays in the vagina for 3 weeks. Then it is removed. After 1 week a new ring is put into the vagina and the cycle is repeated.   Patches containing hormones may be put on the skin. A new patch is worn every week for 3 weeks. During the 4th week, no patch is worn. Then the  cycle is repeated.   The implant (Implanon) is a small, thin capsule containing progesterone. It is put under the skin of a woman's arm. The implant prevents pregnancy for up to 3 years.  You will need to see your healthcare provider to get any of these hormonal forms of birth control.  Contraceptive Devices  Most contraceptive devices form physical or chemical barriers that stop sperm from getting into the uterus.  The male condom is a tube of thin material. (Latex rubber or polyurethane is best.) It is rolled over the erect penis before the penis touches any part of a woman's genital area. The male condom is the best protection against STDs, including HIV and hepatitis B.   The female condom is a 7-inch-long pouch. The pouch is made of polyurethane. It has 2 flexible rings. It is inserted into the vagina before sex. The female condom covers the cervix, vagina, and area around the vagina. It may protect against some STDs, such as HIV and hepatitis B.   Spermicides are chemicals that kill sperm. They are available as foam, jelly, foaming tablets, vaginal suppositories, or cream. They are inserted into the vagina no more than 30 minutes before sex. Spermicides should NOT be used alone. They work much better when they are used with another form of birth control, such as a condom or diaphragm. Spermicides do not protect against STDs.   The sponge is a round, soft piece of polyurethane foam. It is soaked with a spermicide. No more than 24 hours before intercourse, the sponge is dampened with water and inserted into the vagina against the cervix.   The diaphragm is a soft rubber dome stretched over a flexible ring. No more than 6 hours before sex, you fill the diaphragm with a spermicidal jelly or cream and put it into the vagina.   The intrauterine device (IUD) is a small plastic device containing copper or hormones. The IUD prevents the egg from being fertilized or implanting and growing in the uterus. An IUD is put  into the uterus by your healthcare provider. Depending on the type, it may be kept in the uterus 5 to 10 years before it must be replaced with a new one.  You can buy condoms, spermicides, and sponges at drug and grocery stores without a prescription. A diaphragm needs to be fitted by a healthcare provider. If you choose to use an IUD, you will need to see your provider for insertion of the IUD.  Sterilization  Sterilization is a procedure done to close the tubes that carry the sperm or eggs. It may be done with surgery or special devices put into the tubes. A woman or man who is sterilized will no longer be able to have children. These procedures are usually permanent methods of birth control.   Removal of the uterus (hysterectomy) causes a woman to be sterile and unable to get pregnant. Hysterectomy is usually only done if there are problems with the female organs, such as fibroids or abnormal menstrual bleeding.  Natural Family Planning (Periodic Abstinence) and the Withdrawal Method  The natural family planning methods of birth control do not use any devices, drugs, or surgery. To prevent pregnancy you avoid having sex on certain days of each menstrual cycle. Other methods of birth control are more reliable.  The withdrawal method involves removing the penis from the vagina before ejaculation, when semen starts coming out. Often sperm get into the vagina before or during withdrawal. This method is unreliable. It often does not prevent pregnancy.  Emergency Contraception  A pill for emergency birth control may be taken to prevent pregnancy soon after you have had sex without birth control. It may also be used when another method of birth control has failed (for example, if a condom breaks). The pill contains a hormone that will prevent pregnancy if it is taken very soon after intercourse (within 3 to 5 days, depending on the medicine). In many areas, the pills are available at drug stores without a prescription  for women over 18 years old.   A copper intrauterine device (IUD) is another type of emergency birth control. The IUD may be put into the uterus by your healthcare provider within 5 days after unprotected sex. It may prevent pregnancy by stopping fertilization or keeping a fertilized egg from attaching to the womb.  How well do the various methods prevent pregnancy?   The following chart shows the typical failure rates of the different kinds of birth control methods. The failure rate is the number of pregnancies expected per 100 women during 1 year of using each method. The failure rate can depend on how correctly each method is followed. If a method is used perfectly, the failure rate is lower than the typical rate shown here. Use of more than 1 method (for example, birth control pills and condoms) can decrease the chances of failure.                               Percentage of Women Having an     Birth Control             Unintended Pregnancy within the        Method                       First Year of Use    -----------------------------------------------------------                                   Typical Use    Perfect Use    -----------------------------------------------------------   Spermicides                         29%          18%   Natural Family Planning     (Periodic Abstinence)                           9%     Symptothermal method                            2%   Withdrawal                          18%           4%   Diaphragm with Spermicide           16%           6%   Condom     Female                            27%           5%     Male                              17%           2%   Sponge     Women who have given birth        32%          26%     Women who have not given birth    16%           9%   Pill                                 9%           0.3%   IUD     with copper                        1%           0.6%     with hormones                      0.1%         0.1%   Shot (Depo-Provera)                   7%           0.3%   Implant                              1%           0.05%   Patch (Ortho Evra)                   8%           0.3%   Vaginal ring (NuvaRing)              8%           0.3%   Female Sterilization                 0.7%         0.5%   Male Sterilization                   0.2%         0.1%   No Method                           85%          85%   -----------------------------------------------------------      Note: These failure rates are modified from the [employer] [place] January 2008. Facts on Contraceptive Use. Available at http://www.guttmacher.org/pubs/fb_contr_use.html        [facility] (formerly [facility])    If you have any questions regarding to your visit please contact your care team:       Team Red:   Clinic Hours Telephone Number   Dr. Rosa Adler  (pediatrics)  Argelia Vicente NP 7am-7pm  Monday - Thursday   7am-5pm  Fridays  [phone] (fax)    Deb MOYA  [phone]   Urgent Care - [place] and [place] Monday-Friday  [place] - 11am-8pm  Saturday-Sunday  Both sites - 9am-5pm  [phone] - [place]   [phone] [facility]       What options do I have for visits at the clinic other than the traditional office visit?  To expand how we care for you, many of our providers are utilizing electronic visits (e-visits) and telephone visits, when medically appropriate, for interactions with their patients rather than a visit in the clinic.   We also offer nurse visits for many medical concerns. Just like any other service, we will bill your insurance company for this type of visit based on time spent on the phone with your provider. Not all insurance companies cover these visits. Please check with your medical insurance if this type of visit is covered. You will be responsible for any charges that are not paid by your insurance.      E-visits via vidIQ:  generally incur a $35.00 fee.  Telephone visits:  Time  spent on the phone: *charged based on time that is spent on the phone in increments of 10 minutes. Estimated cost:   5-10 mins $30.00   11-20 mins. $59.00   21-30 mins. $85.00     As always, Thank you for trusting us with your health care needs!    Akbar Machado

## 2018-03-15 NOTE — NURSING NOTE
"Chief Complaint   Patient presents with     Contraception       Initial /70  Pulse 96  Temp 97.2  F (36.2  C)  Resp 14  Ht 5' 2.5\" (1.588 m)  Wt 118 lb (53.5 kg)  LMP 02/12/2018  SpO2 99%  BMI 21.24 kg/m2 Estimated body mass index is 21.24 kg/(m^2) as calculated from the following:    Height as of this encounter: 5' 2.5\" (1.588 m).    Weight as of this encounter: 118 lb (53.5 kg).  Medication Reconciliation: complete     Cate Rodriguez. MA      "

## 2019-02-15 DIAGNOSIS — Z30.011 ENCOUNTER FOR INITIAL PRESCRIPTION OF CONTRACEPTIVE PILLS: ICD-10-CM

## 2019-02-15 RX ORDER — DESOGESTREL AND ETHINYL ESTRADIOL 0.15-0.03
KIT ORAL
Qty: 84 TABLET | Refills: 0 | Status: SHIPPED | OUTPATIENT
Start: 2019-02-15 | End: 2019-07-18

## 2019-07-15 ASSESSMENT — ENCOUNTER SYMPTOMS
WEAKNESS: 0
HEADACHES: 0
CONSTIPATION: 0
MYALGIAS: 0
JOINT SWELLING: 0
FEVER: 0
COUGH: 0
PALPITATIONS: 0
BREAST MASS: 0
FREQUENCY: 0
HEMATOCHEZIA: 0
ABDOMINAL PAIN: 0
DIARRHEA: 0
NAUSEA: 0
ARTHRALGIAS: 0
SORE THROAT: 0
SHORTNESS OF BREATH: 0
NERVOUS/ANXIOUS: 0
HEARTBURN: 0
PARESTHESIAS: 0
CHILLS: 0
EYE PAIN: 0
DIZZINESS: 0
DYSURIA: 0
HEMATURIA: 0

## 2019-07-18 ENCOUNTER — OFFICE VISIT (OUTPATIENT)
Dept: FAMILY MEDICINE | Facility: CLINIC | Age: 24
End: 2019-07-18
Payer: COMMERCIAL

## 2019-07-18 VITALS
SYSTOLIC BLOOD PRESSURE: 110 MMHG | WEIGHT: 120 LBS | RESPIRATION RATE: 18 BRPM | OXYGEN SATURATION: 100 % | TEMPERATURE: 99 F | HEART RATE: 99 BPM | DIASTOLIC BLOOD PRESSURE: 78 MMHG | BODY MASS INDEX: 22.08 KG/M2 | HEIGHT: 62 IN

## 2019-07-18 DIAGNOSIS — Z30.09 GENERAL COUNSELING FOR PRESCRIPTION OF ORAL CONTRACEPTIVES: ICD-10-CM

## 2019-07-18 DIAGNOSIS — Z30.011 ENCOUNTER FOR INITIAL PRESCRIPTION OF CONTRACEPTIVE PILLS: ICD-10-CM

## 2019-07-18 DIAGNOSIS — Z12.4 SCREENING FOR MALIGNANT NEOPLASM OF CERVIX: ICD-10-CM

## 2019-07-18 DIAGNOSIS — Z00.00 ROUTINE GENERAL MEDICAL EXAMINATION AT A HEALTH CARE FACILITY: Primary | ICD-10-CM

## 2019-07-18 LAB
HCG UR QL: NEGATIVE
HIV 1+2 AB+HIV1 P24 AG SERPL QL IA: NONREACTIVE

## 2019-07-18 PROCEDURE — 87591 N.GONORRHOEAE DNA AMP PROB: CPT | Performed by: PHYSICIAN ASSISTANT

## 2019-07-18 PROCEDURE — 36415 COLL VENOUS BLD VENIPUNCTURE: CPT | Performed by: PHYSICIAN ASSISTANT

## 2019-07-18 PROCEDURE — 99395 PREV VISIT EST AGE 18-39: CPT | Performed by: PHYSICIAN ASSISTANT

## 2019-07-18 PROCEDURE — 81025 URINE PREGNANCY TEST: CPT | Performed by: PHYSICIAN ASSISTANT

## 2019-07-18 PROCEDURE — 87491 CHLMYD TRACH DNA AMP PROBE: CPT | Performed by: PHYSICIAN ASSISTANT

## 2019-07-18 PROCEDURE — G0145 SCR C/V CYTO,THINLAYER,RESCR: HCPCS | Performed by: PHYSICIAN ASSISTANT

## 2019-07-18 PROCEDURE — 87389 HIV-1 AG W/HIV-1&-2 AB AG IA: CPT | Performed by: PHYSICIAN ASSISTANT

## 2019-07-18 RX ORDER — DESOGESTREL AND ETHINYL ESTRADIOL 0.15-0.03
1 KIT ORAL DAILY
Qty: 84 TABLET | Refills: 3 | Status: SHIPPED | OUTPATIENT
Start: 2019-07-18 | End: 2020-06-17

## 2019-07-18 ASSESSMENT — ENCOUNTER SYMPTOMS
SHORTNESS OF BREATH: 0
ARTHRALGIAS: 0
JOINT SWELLING: 0
BREAST MASS: 0
ABDOMINAL PAIN: 0
PALPITATIONS: 0
MYALGIAS: 0
DIARRHEA: 0
HEMATURIA: 0
COUGH: 0
NERVOUS/ANXIOUS: 0
DIZZINESS: 0
EYE PAIN: 0
NAUSEA: 0
CHILLS: 0
PARESTHESIAS: 0
FEVER: 0
WEAKNESS: 0
DYSURIA: 0
HEADACHES: 0
CONSTIPATION: 0
SORE THROAT: 0
HEMATOCHEZIA: 0
HEARTBURN: 0
FREQUENCY: 0

## 2019-07-18 ASSESSMENT — MIFFLIN-ST. JEOR: SCORE: 1244.57

## 2019-07-18 NOTE — PROGRESS NOTES
SUBJECTIVE:   CC: Shari De La Cruz is an 23 year old woman who presents for preventive health visit.     Healthy Habits:     Getting at least 3 servings of Calcium per day:  NO    Bi-annual eye exam:  NO    Dental care twice a year:  NO    Sleep apnea or symptoms of sleep apnea:  Daytime drowsiness    Diet:  Regular (no restrictions)    Frequency of exercise:  2-3 days/week    Duration of exercise:  30-45 minutes    Taking medications regularly:  Yes    Medication side effects:  None    PHQ-2 Total Score: 0    Additional concerns today:  No          Today's PHQ-2 Score:   PHQ-2 ( 1999 [employer]) 7/18/2019   Q1: Little interest or pleasure in doing things 0   Q2: Feeling down, depressed or hopeless 0   PHQ-2 Score 0   Q1: Little interest or pleasure in doing things -   Q2: Feeling down, depressed or hopeless -   PHQ-2 Score -       Abuse: Current or Past(Physical, Sexual or Emotional)- No  Do you feel safe in your environment? Yes    Social History     Tobacco Use     Smoking status: Never Smoker     Smokeless tobacco: Never Used   Substance Use Topics     Alcohol use: No     Alcohol/week: 0.0 oz         Alcohol Use 7/15/2019   Prescreen: >3 drinks/day or >7 drinks/week? No   Prescreen: >3 drinks/day or >7 drinks/week? -       Reviewed orders with patient.  Reviewed health maintenance and updated orders accordingly - Yes        Pertinent mammograms are reviewed under the imaging tab.  History of abnormal Pap smear: NO - age 21-29 PAP every 3 years recommended     Reviewed and updated as needed this visit by clinical staff  Tobacco  Allergies  Meds  Med Hx  Surg Hx  Fam Hx  Soc Hx              Review of Systems   Constitutional: Negative for chills and fever.   HENT: Negative for congestion, ear pain, hearing loss and sore throat.    Eyes: Negative for pain and visual disturbance.   Respiratory: Negative for cough and shortness of breath.    Cardiovascular: Negative for chest pain, palpitations and peripheral edema.  "  Gastrointestinal: Negative for abdominal pain, constipation, diarrhea, heartburn, hematochezia and nausea.   Breasts:  Negative for tenderness, breast mass and discharge.   Genitourinary: Negative for dysuria, frequency, genital sores, hematuria, pelvic pain, urgency, vaginal bleeding and vaginal discharge.   Musculoskeletal: Negative for arthralgias, joint swelling and myalgias.   Skin: Negative for rash.   Neurological: Negative for dizziness, weakness, headaches and paresthesias.   Psychiatric/Behavioral: Negative for mood changes. The patient is not nervous/anxious.           OBJECTIVE:   /78   Pulse 99   Temp 99  F (37.2  C) (Oral)   Resp 18   Ht 1.57 m (5' 1.81\")   Wt 54.4 kg (120 lb)   SpO2 100%   BMI 22.08 kg/m    Physical Exam  GENERAL: healthy, alert and no distress  EYES: Eyes grossly normal to inspection, PERRL and conjunctivae and sclerae normal  HENT: ear canals and TM's normal, nose and mouth without ulcers or lesions  NECK: no adenopathy, no asymmetry, masses, or scars and thyroid normal to palpation  RESP: lungs clear to auscultation - no rales, rhonchi or wheezes  BREAST: normal without masses, tenderness or nipple discharge and no palpable axillary masses or adenopathy  CV: regular rate and rhythm, normal S1 S2, no S3 or S4, no murmur, click or rub, no peripheral edema and peripheral pulses strong  ABDOMEN: soft, nontender, no hepatosplenomegaly, no masses and bowel sounds normal   (female): normal female external genitalia, normal urethral meatus, vaginal mucosa pink, moist, well rugated, and normal cervix/adnexa/uterus without masses or discharge  MS: no gross musculoskeletal defects noted, no edema  SKIN: no suspicious lesions or rashes  NEURO: Normal strength and tone, mentation intact and speech normal  PSYCH: mentation appears normal, affect normal/bright    Diagnostic Test Results:  Labs reviewed in Epic    ASSESSMENT/PLAN:   1. Routine general medical examination at a " "health care facility  - HIV Screening  - NEISSERIA GONORRHOEA PCR  - CHLAMYDIA TRACHOMATIS PCR  - HCG Qual, Urine ([id number])    2. Screening for malignant neoplasm of cervix  - Pap imaged thin layer screen only - recommended age 21 - 24 years    3. General counseling for prescription of oral contraceptives  - HCG Qual, Urine ([id number])    4. Encounter for initial prescription of contraceptive pills  - desogestrel-ethinyl estradiol (APRI) 0.15-30 MG-MCG tablet; Take 1 tablet by mouth daily  Dispense: 84 tablet; Refill: 3    COUNSELING:  Reviewed preventive health counseling, as reflected in patient instructions    Estimated body mass index is 22.08 kg/m  as calculated from the following:    Height as of this encounter: 1.57 m (5' 1.81\").    Weight as of this encounter: 54.4 kg (120 lb).         reports that she has never smoked. She has never used smokeless tobacco.      Counseling Resources:  ATP IV Guidelines  Pooled Cohorts Equation Calculator  Breast Cancer Risk Calculator  FRAX Risk Assessment  ICSI Preventive Guidelines  Dietary Guidelines for Americans, 2010  USDA's MyPlate  ASA Prophylaxis  Lung CA Screening    Betty Beavers PA-C  [facility]  "

## 2019-07-19 LAB
C TRACH DNA SPEC QL NAA+PROBE: NEGATIVE
N GONORRHOEA DNA SPEC QL NAA+PROBE: NEGATIVE
SPECIMEN SOURCE: NORMAL
SPECIMEN SOURCE: NORMAL

## 2019-07-22 LAB
COPATH REPORT: NORMAL
PAP: NORMAL

## 2019-09-24 NOTE — PROGRESS NOTES
"Subjective     Shari De La Cruz is a 24 year old female who presents to clinic today for the following health issues:    HPI   Patient presents with:  STD: check . boyfriend stated that he was dx of Chlamydia about 2 yrs ago was treated but never follow up   Vaginal Problem: have bumps in vaginal area.        Review of Systems   ROS COMP: Constitutional, HEENT, cardiovascular, pulmonary, gi and gu systems are negative, except as otherwise noted.      Objective    /60   Pulse 82   Temp 98.2  F (36.8  C) (Oral)   Resp 16   Ht 1.57 m (5' 1.81\")   Wt 54.7 kg (120 lb 8 oz)   LMP 09/16/2019   SpO2 100%   BMI 22.17 kg/m    Body mass index is 22.17 kg/m .  Physical Exam   GENERAL: healthy, alert and no distress   (female): normal female external genitalia, normal urethral meatus  and vaginal mucosa pink, moist, well rugated  SKIN: no suspicious lesions or rashes    Diagnostic Test Results:  Labs reviewed in Epic        Assessment & Plan     1. Exposure to chlamydia  - Chlamydia trachomatis PCR  - Neisseria gonorrhoeae PCR  - Wet prep    2. Genital lesion, female  - Herpes: HSV IgM antibody  - Herpes Simplex Virus 1 and 2 IgG    3. Candidiasis of vagina  - miconazole (MONISTAT 7 SIMPLY CURE) 2 % cream; Place 1 applicator vaginally At Bedtime  Dispense: 45 g; Refill: 0     Use medication as directed.  Follow up on labs  Patient amenable to this follow up plan.     No follow-ups on file.    Betty Beavers PA-C  [facility]      "

## 2019-09-26 ENCOUNTER — OFFICE VISIT (OUTPATIENT)
Dept: FAMILY MEDICINE | Facility: CLINIC | Age: 24
End: 2019-09-26
Payer: COMMERCIAL

## 2019-09-26 VITALS
TEMPERATURE: 98.2 F | WEIGHT: 120.5 LBS | HEIGHT: 62 IN | DIASTOLIC BLOOD PRESSURE: 60 MMHG | HEART RATE: 82 BPM | OXYGEN SATURATION: 100 % | RESPIRATION RATE: 16 BRPM | SYSTOLIC BLOOD PRESSURE: 110 MMHG | BODY MASS INDEX: 22.18 KG/M2

## 2019-09-26 DIAGNOSIS — Z20.2 EXPOSURE TO CHLAMYDIA: Primary | ICD-10-CM

## 2019-09-26 DIAGNOSIS — B37.31 CANDIDIASIS OF VAGINA: ICD-10-CM

## 2019-09-26 DIAGNOSIS — N94.9 GENITAL LESION, FEMALE: ICD-10-CM

## 2019-09-26 LAB
SPECIMEN SOURCE: ABNORMAL
WET PREP SPEC: ABNORMAL

## 2019-09-26 PROCEDURE — 87210 SMEAR WET MOUNT SALINE/INK: CPT | Performed by: PHYSICIAN ASSISTANT

## 2019-09-26 PROCEDURE — 87591 N.GONORRHOEAE DNA AMP PROB: CPT | Performed by: PHYSICIAN ASSISTANT

## 2019-09-26 PROCEDURE — 99213 OFFICE O/P EST LOW 20 MIN: CPT | Performed by: PHYSICIAN ASSISTANT

## 2019-09-26 PROCEDURE — 87491 CHLMYD TRACH DNA AMP PROBE: CPT | Performed by: PHYSICIAN ASSISTANT

## 2019-09-26 PROCEDURE — 36415 COLL VENOUS BLD VENIPUNCTURE: CPT | Performed by: PHYSICIAN ASSISTANT

## 2019-09-26 PROCEDURE — 86696 HERPES SIMPLEX TYPE 2 TEST: CPT | Performed by: PHYSICIAN ASSISTANT

## 2019-09-26 PROCEDURE — 86695 HERPES SIMPLEX TYPE 1 TEST: CPT | Performed by: PHYSICIAN ASSISTANT

## 2019-09-26 PROCEDURE — 86694 HERPES SIMPLEX NES ANTBDY: CPT | Performed by: PHYSICIAN ASSISTANT

## 2019-09-26 RX ORDER — MICONAZOLE NITRATE 2 %
1 CREAM WITH APPLICATOR VAGINAL AT BEDTIME
Qty: 45 G | Refills: 0 | Status: SHIPPED | OUTPATIENT
Start: 2019-09-26

## 2019-09-26 ASSESSMENT — MIFFLIN-ST. JEOR: SCORE: 1241.83

## 2019-09-26 NOTE — PATIENT INSTRUCTIONS
Patient Education     Vaginal Infection: Understanding the Vaginal Environment  The vagina is a canal. It connects the uterus (womb) to the outside of the body. It is home to many types of bacteria and other tiny organisms. These different bacteria most often stay balanced in number. This keeps the vagina healthy. If the balance changes, it can cause infection.   A healthy environment  Many types of bacteria are present in a healthy vagina. When balanced, they don t cause problems. Small amounts of yeast may also be present without causing problems. The most common type of bacteria in the vagina is lactobacillus. It helps keep the vagina at a low pH. A low pH keeps bad bacteria from taking over.  Normal vaginal discharge  The vagina makes fluid. It is sent out as discharge. This also keeps the vagina healthy. Normal discharge can be clear, white, or yellowish. Most women find that normal discharge varies in amount and color through the month.  An unhealthy environment  The vaginal environment may get out of balance. This may result in a vaginal infection. There are a few reasons this can happen. The pH may have changed. The amount of one organism, such as yeast, may increase. Or an outside organism may get into the vagina and throw off the balance:    Bacterial vaginosis (BV). BV is due to an imbalance in the normal bacteria in the vagina. Lactobacillus bacteria decrease. As a result, the numbers of bad bacteria increase.    Candidiasis (yeast infection). Yeast is a type of fungus. A yeast infection occurs when yeast cells in the vagina increase. They then attack vaginal tissues. A type of yeast called Candida albicans is often involved.    Trichomoniasis ( trich ). Trich is a parasite. It is passed from one person to another during sex. Men with trich often don t have any symptoms. In women, it can take weeks or months before symptoms appear.  Date Last Reviewed: 3/1/2017    0627-4023 [employer]  [address] [place] [address]. All rights reserved. This information is not intended as a substitute for professional medical care. Always follow your healthcare professional's instructions.           Patient Education     Preventing Vaginitis     Use mild, unscented soap when you bathe or shower to avoid irritating your vagina.    Vaginitis is irritation or infection of the vagina or vulva (the outside opening of the vagina). Vaginitis can be caused by bacteria, viruses, parasites, or yeast. Chemicals (such as in perfumes or soaps or in spermicides) can sometimes be a cause. Vaginitis can be caused by hormone changes in pregnancy or with menopause. You can help prevent vaginitis. Follow the tips below. And see your healthcare provider if you have any symptoms.  Hygiene    Avoid chemicals. Do not use vaginal sprays. Do not use scented toilet paper or tampons that are scented. Sprays and scents have chemicals that can irritate your vagina.    Do not douche unless you are told to by your healthcare provider. Douching is rarely needed. And it upsets the normal balance in the vagina.    Wash yourself well. Wash the outer vaginal area (vulva) every day with mild, unscented soap. Keep it as dry as possible.    Wipe correctly. Make sure to wipe from front to back after a bowel movement. This helps keep from spreading bacteria from your anus to your vagina.    Change your tampon often. During your period, make sure to change your tampon as often as directed on the package. This allows the normal flow of vaginal discharge and blood.  Lifestyle    Limit your number of sexual partners. The more partners you have, the greater your risk of infection. Using condoms helps reduce your risk.    Get enough sleep. Sleep helps keep your body s immune system healthy. This helps you fight infection.    Lose weight, if needed. Excess weight can reduce air circulation around your vagina. This can increase your risk of  infection.    Exercise regularly. Regular activity helps keep your body healthy.    Take antibiotics only as directed. Antibiotics can change the normal chemical balance in the vagina.    Clothing    Don t sit in wet clothes. Yeast thrives when it s warm and damp.    Don t wear tight pants. And don t wear tights, leggings, or hose without a cotton crotch. These types of clothing trap warmth and moisture.    Wear cotton underwear. Cotton lets air circulate around the vagina.  Symptoms of vaginitis    Irritation, swelling, or itching of the genital area    Vaginal discharge    Bad vaginal odor    Pain or burning during urination   Date Last Reviewed: 12/1/2016 2000-2018 [employer]. [address] [place] [address], [place] [address]. All rights reserved. This information is not intended as a substitute for professional medical care. Always follow your healthcare professional's instructions.

## 2019-09-27 LAB
C TRACH DNA SPEC QL NAA+PROBE: NEGATIVE
HSV1 IGG SERPL QL IA: >8 AI (ref 0–0.8)
HSV2 IGG SERPL QL IA: <0.2 AI (ref 0–0.8)
N GONORRHOEA DNA SPEC QL NAA+PROBE: NEGATIVE
SPECIMEN SOURCE: NORMAL
SPECIMEN SOURCE: NORMAL

## 2019-10-01 LAB — HSV 1+2 IGM SER-IMP: 0.68 INDEX VALUE (ref 0–0.89)

## 2019-10-02 NOTE — PROGRESS NOTES
"Beatriz De La Cruz is a 24 year old female who presents to clinic today for the following health issues:    HPI   Patient presents with:  Results: Lab results on 09/26/2019    Reviewed labs with Patient.     Review of Systems   ROS COMP: Constitutional, HEENT, cardiovascular, pulmonary, gi and gu systems are negative, except as otherwise noted.      Objective    /60   Pulse 99   Temp 98.7  F (37.1  C) (Oral)   Resp 18   Ht 1.57 m (5' 1.81\")   Wt 55.5 kg (122 lb 6.4 oz)   LMP 09/16/2019   SpO2 100%   BMI 22.52 kg/m    Body mass index is 22.52 kg/m .  Physical Exam     Total visit time is 15 Minutes with > 10 Minutes spent in care and consultation regarding HSV infection with information and follow up plan.      Diagnostic Test Results:  Labs reviewed in Epic        Assessment & Plan     1. Herpes simplex virus infection    Patient education materials dispensed and reviewed.  Follow up if symptoms should persist, change or worsen.  Patient amenable to this follow up plan.          Betty Beavers PA-C  [facility]      "

## 2019-10-08 ENCOUNTER — OFFICE VISIT (OUTPATIENT)
Dept: FAMILY MEDICINE | Facility: CLINIC | Age: 24
End: 2019-10-08
Payer: COMMERCIAL

## 2019-10-08 VITALS
WEIGHT: 122.4 LBS | TEMPERATURE: 98.7 F | BODY MASS INDEX: 22.52 KG/M2 | OXYGEN SATURATION: 100 % | SYSTOLIC BLOOD PRESSURE: 110 MMHG | HEIGHT: 62 IN | HEART RATE: 99 BPM | DIASTOLIC BLOOD PRESSURE: 60 MMHG | RESPIRATION RATE: 18 BRPM

## 2019-10-08 DIAGNOSIS — B00.9 HERPES SIMPLEX VIRUS INFECTION: Primary | ICD-10-CM

## 2019-10-08 PROCEDURE — 99213 OFFICE O/P EST LOW 20 MIN: CPT | Performed by: PHYSICIAN ASSISTANT

## 2019-10-08 ASSESSMENT — MIFFLIN-ST. JEOR: SCORE: 1250.45

## 2019-10-08 NOTE — PATIENT INSTRUCTIONS
Patient Education     Herpes  If you have herpes, you re not alone. Millions of Americans have it. Herpes has no cure. But you can control it and learn how to protect yourself and others from outbreaks.  What is herpes?  Herpes is a chronic (lifelong) virus. It can cause sores and discomfort. You get it from contact with someone who carries the virus. If sores occur on the lips, you have oral herpes. If sores occur on the penis or around the vagina, you have genital herpes.  Herpes outbreaks    The first outbreak of herpes sores is usually the most severe. Then, the soldiers of the body s immune system, white blood cells, produce antibodies. These antibodies help neutralize the herpes virus and may help make future attacks less severe.    Some people have only one outbreak of sores. Some people have periods of frequent outbreaks (every few weeks). Outbreaks of herpes sores usually happen less often over time.    Herpes sores may appear without a cause. Outbreaks are more likely when the immune system is weak. Other viral infections (such as a cold) can cause outbreaks. Stress from a poor diet, fatigue, or emotional upset can lead to outbreaks of sores. Exposure to strong sunlight often causes herpes sores to reappear.   To help prevent outbreaks    To prevent oral herpes outbreaks, avoid overexposure to wind, sun, and extreme temperatures. Use sunscreen and lip balm on affected areas.    If you are having frequent outbreaks, ask your healthcare provider about medicines that can help prevent outbreaks.  How herpes spreads to others  Herpes can be spread during an outbreak. But even without sores present, you can still  shed  the virus and infect others. You can take steps to prevent this.  To protect yourself and others    If you have an oral sore, avoid kissing and oral-genital contact.    If you have a genital sore, avoid intercourse. Also avoid oral-genital contact.    Wash your hands after touching a  sore.    Use a condom each time you have sex. You can pass the virus even when sores aren t present. If you re unsure about the timing of certain kinds of physical contact, ask your health care provider.    Tell any new partners that you have herpes.    If you re a woman, have Pap tests as often as your healthcare provider recommends.    A woman can spread herpes to their  during the birth process, whether or not they have an active genital sore. If pregnant, don't forget to tell your healthcare provider early in the pregnancy.     In some cases, daily antiviral medicine (acyclovir, famcyclovir, or valavyclovir), in addition to consistent condom use, may reduce your chances of spreading herpes to an uninfected partner. Ask your healthcare provider if this medicine would be helpful for you.  Resources  [employer] STD Hotline  622.627.6933  www.ashastd.org  Centers for Disease Control and Prevention  214.347.4306  www.cdc.gov/std   Date Last Reviewed: 2016-2018 [employer]. [address] [place] [address], [place] [address]. All rights reserved. This information is not intended as a substitute for professional medical care. Always follow your healthcare professional's instructions.

## 2019-11-19 ENCOUNTER — MYC MEDICAL ADVICE (OUTPATIENT)
Dept: FAMILY MEDICINE | Facility: CLINIC | Age: 24
End: 2019-11-19

## 2019-11-19 DIAGNOSIS — Z23 NEED FOR VACCINATION: Primary | ICD-10-CM

## 2019-11-19 DIAGNOSIS — Z11.1 SCREENING FOR TUBERCULOSIS: ICD-10-CM

## 2019-11-20 NOTE — TELEPHONE ENCOUNTER
Printed form and put in Dr. Galicia's bin as Betty Beavers PA-C is out of office.  Zina Hernández  [employer],

## 2019-11-21 NOTE — TELEPHONE ENCOUNTER
Called patient as she did not read her [employer] messages. She would like form faxed to her at [phone] and will call back to make appointment. This was faxed to her.  Zina Pham,

## 2019-12-03 ENCOUNTER — ALLIED HEALTH/NURSE VISIT (OUTPATIENT)
Dept: NURSING | Facility: CLINIC | Age: 24
End: 2019-12-03
Payer: COMMERCIAL

## 2019-12-03 DIAGNOSIS — Z23 NEED FOR VACCINATION: ICD-10-CM

## 2019-12-03 DIAGNOSIS — Z11.1 VISIT FOR MANTOUX TEST: Primary | ICD-10-CM

## 2019-12-03 PROCEDURE — 99207 ZZC NO CHARGE NURSE ONLY: CPT

## 2019-12-03 PROCEDURE — 36415 COLL VENOUS BLD VENIPUNCTURE: CPT | Performed by: FAMILY MEDICINE

## 2019-12-03 PROCEDURE — 86787 VARICELLA-ZOSTER ANTIBODY: CPT | Performed by: FAMILY MEDICINE

## 2019-12-03 PROCEDURE — 86706 HEP B SURFACE ANTIBODY: CPT | Performed by: FAMILY MEDICINE

## 2019-12-03 PROCEDURE — 86580 TB INTRADERMAL TEST: CPT

## 2019-12-04 LAB
HBV SURFACE AB SERPL IA-ACNC: 51.64 M[IU]/ML
VZV IGG SER QL IA: 0.3 AI (ref 0–0.8)

## 2019-12-04 NOTE — RESULT ENCOUNTER NOTE
Please call patient:    You are immune to hepatitis B so do not need any further hepatitis B vaccination.    You are not immune to varicella (chicken pox), so do need 2 varicella vaccines. Call our appointment line at [phone] or go to [email or web address] to schedule a nurse only visit for this.     Rosa Galicia MD

## 2019-12-05 ENCOUNTER — ALLIED HEALTH/NURSE VISIT (OUTPATIENT)
Dept: NURSING | Facility: CLINIC | Age: 24
End: 2019-12-05
Payer: COMMERCIAL

## 2019-12-05 DIAGNOSIS — Z23 NEED FOR VACCINATION: Primary | ICD-10-CM

## 2019-12-05 DIAGNOSIS — Z11.1 SCREENING EXAMINATION FOR PULMONARY TUBERCULOSIS: Primary | ICD-10-CM

## 2019-12-05 LAB
PPDINDURATION: 0 MM (ref 0–5)
PPDREDNESS: 0 MM

## 2019-12-05 PROCEDURE — 90716 VAR VACCINE LIVE SUBQ: CPT

## 2019-12-05 PROCEDURE — 99207 ZZC NO CHARGE NURSE ONLY: CPT

## 2019-12-05 PROCEDURE — 90471 IMMUNIZATION ADMIN: CPT

## 2019-12-05 NOTE — PATIENT INSTRUCTIONS
[facility] result:  Lab Results   Component Value Date    PPDREDNESS 0 12/05/2019    PPDINDURATIO 0 12/05/2019     Is induration greater than 5mm?  Leatha Mo RN

## 2020-01-08 ENCOUNTER — ALLIED HEALTH/NURSE VISIT (OUTPATIENT)
Dept: NURSING | Facility: CLINIC | Age: 25
End: 2020-01-08
Payer: COMMERCIAL

## 2020-01-08 DIAGNOSIS — Z23 NEED FOR VARICELLA VACCINE: Primary | ICD-10-CM

## 2020-01-08 PROCEDURE — 90471 IMMUNIZATION ADMIN: CPT

## 2020-01-08 PROCEDURE — 99207 ZZC NO CHARGE NURSE ONLY: CPT

## 2020-01-08 PROCEDURE — 90716 VAR VACCINE LIVE SUBQ: CPT

## 2020-01-08 NOTE — PROGRESS NOTES
Prior to immunization administration, verified patients identity using patient s name and date of birth. Please see Immunization Activity for additional information.     Screening Questionnaire for Adult Immunization    Are you sick today?   No   Do you have allergies to medications, food, a vaccine component or latex?   No   Have you ever had a serious reaction after receiving a vaccination?   No   Do you have a long-term health problem with heart, lung, kidney, or metabolic disease (e.g., diabetes), asthma, a blood disorder, no spleen, complement component deficiency, a cochlear implant, or a spinal fluid leak?  Are you on long-term aspirin therapy?   No   Do you have cancer, leukemia, HIV/AIDS, or any other immune system problem?   No   Do you have a parent, brother, or sister with an immune system problem?   No   In the past 3 months, have you taken medications that affect  your immune system, such as prednisone, other steroids, or anticancer drugs; drugs for the treatment of rheumatoid arthritis, Crohn s disease, or psoriasis; or have you had radiation treatments?   No   Have you had a seizure, or a brain or other nervous system problem?   No   During the past year, have you received a transfusion of blood or blood    products, or been given immune (gamma) globulin or antiviral drug?   No   For women: Are you pregnant or is there a chance you could become       pregnant during the next month?   No   Have you received any vaccinations in the past 4 weeks?   No     Immunization questionnaire answers were all negative.        Per orders of Argelia Vicente, injection of Varicella given by Wanda Mcdaniel MA. Patient instructed to remain in clinic for 15 minutes afterwards, and to report any adverse reaction to me immediately.       Screening performed by Wanda Mcdaniel MA on 1/8/2020 at 9:59 AM.

## 2020-09-08 DIAGNOSIS — Z30.011 ENCOUNTER FOR INITIAL PRESCRIPTION OF CONTRACEPTIVE PILLS: ICD-10-CM

## 2020-09-10 RX ORDER — DESOGESTREL AND ETHINYL ESTRADIOL 0.15-0.03
KIT ORAL
Qty: 84 TABLET | Refills: 0 | Status: SHIPPED | OUTPATIENT
Start: 2020-09-10 | End: 2020-12-27

## 2020-12-13 ENCOUNTER — HEALTH MAINTENANCE LETTER (OUTPATIENT)
Age: 25
End: 2020-12-13

## 2020-12-24 DIAGNOSIS — Z30.011 ENCOUNTER FOR INITIAL PRESCRIPTION OF CONTRACEPTIVE PILLS: ICD-10-CM

## 2020-12-24 NOTE — LETTER
December 31, 2020        Shari De La Cruz  [address] [facility] [id number]                Dear Shari,       Your provider has sent a 30 day rebecca refill of desogestrel-ethinyl estradiol (APRI) 0.15-30 MG-MCG tablet. You are due for an appointment for further refills. Appointment options could include: an in person office visit, telephone visit or Evisit through [facility]. Please contact the clinic to schedule an appointment for further refills.     Sincerely,     Iza Bennett MA

## 2020-12-27 RX ORDER — DESOGESTREL AND ETHINYL ESTRADIOL 0.15-0.03
1 KIT ORAL DAILY
Qty: 28 TABLET | Refills: 0 | Status: SHIPPED | OUTPATIENT
Start: 2020-12-27

## 2020-12-27 NOTE — TELEPHONE ENCOUNTER
Medication is being filled for 1 time refill only due to:  Patient needs to be seen because it has been more than one year since last visit.   Please call to schedule annual exam.  Tara Archer RN

## 2021-09-26 ENCOUNTER — HEALTH MAINTENANCE LETTER (OUTPATIENT)
Age: 26
End: 2021-09-26

## 2022-01-16 ENCOUNTER — HEALTH MAINTENANCE LETTER (OUTPATIENT)
Age: 27
End: 2022-01-16

## 2023-04-23 ENCOUNTER — HEALTH MAINTENANCE LETTER (OUTPATIENT)
Age: 28
End: 2023-04-23